# Patient Record
Sex: FEMALE | Race: BLACK OR AFRICAN AMERICAN | HISPANIC OR LATINO | Employment: FULL TIME | URBAN - METROPOLITAN AREA
[De-identification: names, ages, dates, MRNs, and addresses within clinical notes are randomized per-mention and may not be internally consistent; named-entity substitution may affect disease eponyms.]

---

## 2017-08-09 ENCOUNTER — OFFICE VISIT (OUTPATIENT)
Dept: OBSTETRICS AND GYNECOLOGY | Facility: CLINIC | Age: 32
End: 2017-08-09
Payer: COMMERCIAL

## 2017-08-09 VITALS
WEIGHT: 185.19 LBS | HEIGHT: 65 IN | SYSTOLIC BLOOD PRESSURE: 110 MMHG | BODY MASS INDEX: 30.85 KG/M2 | DIASTOLIC BLOOD PRESSURE: 60 MMHG

## 2017-08-09 DIAGNOSIS — Z30.9 ENCOUNTER FOR CONTRACEPTIVE MANAGEMENT, UNSPECIFIED TYPE: ICD-10-CM

## 2017-08-09 DIAGNOSIS — Z01.419 ENCOUNTER FOR GYNECOLOGICAL EXAMINATION WITHOUT ABNORMAL FINDING: Primary | ICD-10-CM

## 2017-08-09 PROCEDURE — 99999 PR PBB SHADOW E&M-EST. PATIENT-LVL III: CPT | Mod: PBBFAC,,, | Performed by: OBSTETRICS & GYNECOLOGY

## 2017-08-09 PROCEDURE — 99395 PREV VISIT EST AGE 18-39: CPT | Mod: S$GLB,,, | Performed by: OBSTETRICS & GYNECOLOGY

## 2017-08-09 RX ORDER — ETONOGESTREL AND ETHINYL ESTRADIOL VAGINAL RING .015; .12 MG/D; MG/D
RING VAGINAL
Qty: 1 EACH | Refills: 12 | Status: SHIPPED | OUTPATIENT
Start: 2017-08-09 | End: 2017-10-09 | Stop reason: SDUPTHER

## 2017-08-09 NOTE — PROGRESS NOTES
HISTORY OF PRESENT ILLNESS:    Angelita Figueroa is a 31 y.o. female, , No LMP recorded.,  presents for a routine exam and has no complaints.  Patient is on current contraception without difficulties. Normal monthly cycles, no break through bleeding or bothersome side effects. She desires to continue current contraception.    Past Medical History:   Diagnosis Date    Abnormal Pap smear     Anemia     mild       History reviewed. No pertinent surgical history.    MEDICATIONS AND ALLERGIES:      Current Outpatient Prescriptions:     NUVARING 0.12-0.015 mg/24 hr vaginal ring, Insert vaginally for 3  consecutive weeks, then  remove for 1 week., Disp: 1 each, Rfl: 12    No Known Allergies    Family History   Problem Relation Age of Onset    Diabetes Paternal Grandfather     Hypertension Paternal Grandfather     Diabetes Paternal Grandmother     Hypertension Paternal Grandmother     Diabetes Maternal Grandmother     Hypertension Maternal Grandmother     Diabetes Maternal Grandfather     Hypertension Maternal Grandfather     Breast cancer Neg Hx     Ovarian cancer Neg Hx        Social History     Social History    Marital status: Single     Spouse name: N/A    Number of children: N/A    Years of education: N/A     Occupational History    Not on file.     Social History Main Topics    Smoking status: Never Smoker    Smokeless tobacco: Never Used    Alcohol use 0.6 oz/week     1 Glasses of wine per week      Comment: social    Drug use: No    Sexual activity: Yes     Partners: Male     Birth control/ protection: None     Other Topics Concern    Not on file     Social History Narrative    No narrative on file       COMPREHENSIVE GYN HISTORY:  PAP History: Denies abnormal Paps.  Infection History: Denies STDs. Denies PID.  Benign History: Denies uterine fibroids. Denies ovarian cysts. Denies endometriosis. Denies other conditions.  Cancer History: Denies cervical cancer. Denies uterine cancer  "or hyperplasia. Denies ovarian cancer. Denies vulvar cancer or pre-cancer. Denies vaginal cancer or pre-cancer. Denies breast cancer. Denies colon cancer.  Sexual Activity History: Reports currently being sexually active  Menstrual History: Every   days, flows for    days. (Light, Mod, Heavy) flow.  Contraception:    ROS:  GENERAL: No weight changes. No swelling. No fatigue. No fever.  CARDIOVASCULAR: No chest pain. No shortness of breath. No leg cramps.   NEUROLOGICAL: No headaches. No vision changes.  BREASTS: No pain. No lumps. No discharge.  ABDOMEN: No pain. No nausea. No vomiting. No diarrhea. No constipation.  REPRODUCTIVE: No abnormal bleeding.   VULVA: No pain. No lesions. No itching.  VAGINA: No relaxation. No itching. No odor. No discharge. No lesions.  URINARY: No incontinence. No nocturia. No frequency. No dysuria.    Ht 5' 5" (1.651 m)   Wt 84 kg (185 lb 3 oz)   BMI 30.82 kg/m²     PE:  APPEARANCE: Well nourished, well developed, in no acute distress.  AFFECT: WNL, alert and oriented x 3.  SKIN: No acne or hirsutism.  NECK: Neck symmetric, without masses or thyromegaly.  NODES: No inguinal, cervical, axillary or femoral lymph node enlargement.  CHEST: Good respiratory effort.   ABDOMEN: Soft. No tenderness or masses. No hepatosplenomegaly. No hernias.  BREASTS: Symmetrical, no skin changes, visible lesions, palpable masses or nipple discharge bilaterally.  PELVIC: External female genitalia without lesions.  Female hair distribution. Adequate perineal body, Normal urethral meatus. Vagina moist and well rugated without lesions or discharge.  No significant cystocele or rectocele present. Cervix pink without lesions, discharge or tenderness. Uterus is 4-6 week size, regular, mobile and nontender. Adnexa without masses or tenderness.  EXTREMITIES: No edema    PROCEDURES:  Pap    DIAGNOSIS:  1. Gyn exam  2. Contraception    LABS AND TESTS ORDERED:    MEDICATIONS PRESCRIBED:    COUNSELING:  The patient was " counseled today on:  -A.C.S. Pap and pelvic exam guidelines, recomendations for yearly mammogram, monthly self breast exams and to follow up with her PCP for other health maintenance.    FOLLOW-UP with me annually.

## 2017-08-09 NOTE — PATIENT INSTRUCTIONS
[facility], operated by [facility]   Dr. Colten Benavides   [address] [address]   Phone: [phone]   Fax: [phone]

## 2017-09-07 ENCOUNTER — OFFICE VISIT (OUTPATIENT)
Dept: INTERNAL MEDICINE | Facility: CLINIC | Age: 32
End: 2017-09-07
Attending: FAMILY MEDICINE
Payer: COMMERCIAL

## 2017-09-07 ENCOUNTER — LAB VISIT (OUTPATIENT)
Dept: LAB | Facility: OTHER | Age: 32
End: 2017-09-07
Attending: FAMILY MEDICINE
Payer: COMMERCIAL

## 2017-09-07 VITALS
WEIGHT: 186.06 LBS | DIASTOLIC BLOOD PRESSURE: 60 MMHG | OXYGEN SATURATION: 98 % | SYSTOLIC BLOOD PRESSURE: 92 MMHG | BODY MASS INDEX: 31 KG/M2 | HEART RATE: 78 BPM | HEIGHT: 65 IN

## 2017-09-07 DIAGNOSIS — Z00.00 ANNUAL PHYSICAL EXAM: ICD-10-CM

## 2017-09-07 DIAGNOSIS — Z00.00 ANNUAL PHYSICAL EXAM: Primary | ICD-10-CM

## 2017-09-07 DIAGNOSIS — E66.9 OBESITY, UNSPECIFIED OBESITY SEVERITY, UNSPECIFIED OBESITY TYPE: ICD-10-CM

## 2017-09-07 DIAGNOSIS — Z11.3 SCREEN FOR STD (SEXUALLY TRANSMITTED DISEASE): ICD-10-CM

## 2017-09-07 LAB
25(OH)D3+25(OH)D2 SERPL-MCNC: 33 NG/ML
ABO + RH BLD: NORMAL
ALBUMIN SERPL BCP-MCNC: 3.8 G/DL
ALP SERPL-CCNC: 66 U/L
ALT SERPL W/O P-5'-P-CCNC: 13 U/L
ANION GAP SERPL CALC-SCNC: 10 MMOL/L
AST SERPL-CCNC: 15 U/L
BASOPHILS # BLD AUTO: 0.01 K/UL
BASOPHILS NFR BLD: 0.2 %
BILIRUB SERPL-MCNC: 0.4 MG/DL
BLD GP AB SCN CELLS X3 SERPL QL: NORMAL
BUN SERPL-MCNC: 15 MG/DL
CALCIUM SERPL-MCNC: 9.8 MG/DL
CHLORIDE SERPL-SCNC: 105 MMOL/L
CHOLEST SERPL-MCNC: 210 MG/DL
CHOLEST/HDLC SERPL: 3.2 {RATIO}
CO2 SERPL-SCNC: 25 MMOL/L
CREAT SERPL-MCNC: 0.8 MG/DL
DIFFERENTIAL METHOD: ABNORMAL
EOSINOPHIL # BLD AUTO: 0 K/UL
EOSINOPHIL NFR BLD: 0.7 %
ERYTHROCYTE [DISTWIDTH] IN BLOOD BY AUTOMATED COUNT: 14.9 %
EST. GFR  (AFRICAN AMERICAN): >60 ML/MIN/1.73 M^2
EST. GFR  (NON AFRICAN AMERICAN): >60 ML/MIN/1.73 M^2
ESTIMATED AVG GLUCOSE: 105 MG/DL
FERRITIN SERPL-MCNC: 30 NG/ML
FOLATE SERPL-MCNC: 13.3 NG/ML
GLUCOSE SERPL-MCNC: 82 MG/DL
HAV IGM SERPL QL IA: NEGATIVE
HBA1C MFR BLD HPLC: 5.3 %
HBV CORE IGM SERPL QL IA: NEGATIVE
HBV SURFACE AG SERPL QL IA: NEGATIVE
HCT VFR BLD AUTO: 39.1 %
HCV AB SERPL QL IA: NEGATIVE
HDLC SERPL-MCNC: 66 MG/DL
HDLC SERPL: 31.4 %
HGB BLD-MCNC: 12.3 G/DL
HIV 1+2 AB+HIV1 P24 AG SERPL QL IA: NEGATIVE
IRON SERPL-MCNC: 62 UG/DL
LDLC SERPL CALC-MCNC: 133.2 MG/DL
LYMPHOCYTES # BLD AUTO: 2.2 K/UL
LYMPHOCYTES NFR BLD: 37.7 %
MCH RBC QN AUTO: 26.9 PG
MCHC RBC AUTO-ENTMCNC: 31.5 G/DL
MCV RBC AUTO: 85 FL
MONOCYTES # BLD AUTO: 0.2 K/UL
MONOCYTES NFR BLD: 3.4 %
NEUTROPHILS # BLD AUTO: 3.4 K/UL
NEUTROPHILS NFR BLD: 57.8 %
NONHDLC SERPL-MCNC: 144 MG/DL
PLATELET # BLD AUTO: 272 K/UL
PMV BLD AUTO: 10.5 FL
POTASSIUM SERPL-SCNC: 3.9 MMOL/L
PROT SERPL-MCNC: 7.7 G/DL
RBC # BLD AUTO: 4.58 M/UL
SATURATED IRON: 13 %
SODIUM SERPL-SCNC: 140 MMOL/L
TOTAL IRON BINDING CAPACITY: 462 UG/DL
TRANSFERRIN SERPL-MCNC: 312 MG/DL
TRIGL SERPL-MCNC: 54 MG/DL
VIT B12 SERPL-MCNC: 413 PG/ML
WBC # BLD AUTO: 5.81 K/UL

## 2017-09-07 PROCEDURE — 83036 HEMOGLOBIN GLYCOSYLATED A1C: CPT

## 2017-09-07 PROCEDURE — 82306 VITAMIN D 25 HYDROXY: CPT

## 2017-09-07 PROCEDURE — 82746 ASSAY OF FOLIC ACID SERUM: CPT

## 2017-09-07 PROCEDURE — 36415 COLL VENOUS BLD VENIPUNCTURE: CPT

## 2017-09-07 PROCEDURE — 86592 SYPHILIS TEST NON-TREP QUAL: CPT

## 2017-09-07 PROCEDURE — 82607 VITAMIN B-12: CPT

## 2017-09-07 PROCEDURE — 99999 PR PBB SHADOW E&M-EST. PATIENT-LVL III: CPT | Mod: PBBFAC,,, | Performed by: FAMILY MEDICINE

## 2017-09-07 PROCEDURE — 83540 ASSAY OF IRON: CPT

## 2017-09-07 PROCEDURE — 86703 HIV-1/HIV-2 1 RESULT ANTBDY: CPT

## 2017-09-07 PROCEDURE — 80074 ACUTE HEPATITIS PANEL: CPT

## 2017-09-07 PROCEDURE — 80061 LIPID PANEL: CPT

## 2017-09-07 PROCEDURE — 86850 RBC ANTIBODY SCREEN: CPT

## 2017-09-07 PROCEDURE — 82728 ASSAY OF FERRITIN: CPT

## 2017-09-07 PROCEDURE — 80053 COMPREHEN METABOLIC PANEL: CPT

## 2017-09-07 PROCEDURE — 86900 BLOOD TYPING SEROLOGIC ABO: CPT

## 2017-09-07 PROCEDURE — 99385 PREV VISIT NEW AGE 18-39: CPT | Mod: S$GLB,,, | Performed by: FAMILY MEDICINE

## 2017-09-07 PROCEDURE — 85025 COMPLETE CBC W/AUTO DIFF WBC: CPT

## 2017-09-07 PROCEDURE — 87591 N.GONORRHOEAE DNA AMP PROB: CPT

## 2017-09-07 NOTE — PROGRESS NOTES
"Subjective:      Patient ID: Angelita Figueroa is a 31 y.o. female.    Chief Complaint: Establish Care    She is here for annual exam. She has a history of anemia. She drinks 8 fl oz water daily. She does dance once a week and walks 1 hour once a week.     Breakfast  Smoothie-strawberry, juices, protein   Cream of wheat  Honey bunches of oats    Snacks  Candy   plantain chips    Lunch   Salads-chicken masha  Pizza    Dinner  BBQ and mac cheese       Review of Systems   Constitutional: Negative for activity change and unexpected weight change.   HENT: Negative for hearing loss, rhinorrhea and trouble swallowing.    Eyes: Negative for discharge and visual disturbance.   Respiratory: Negative for chest tightness and wheezing.    Cardiovascular: Negative for chest pain and palpitations.   Gastrointestinal: Negative for blood in stool, constipation, diarrhea and vomiting.   Endocrine: Negative for polydipsia and polyuria.   Genitourinary: Negative for difficulty urinating, dysuria, hematuria and menstrual problem.   Musculoskeletal: Negative for arthralgias, joint swelling and neck pain.   Neurological: Negative for weakness and headaches.   Psychiatric/Behavioral: Negative for confusion and dysphoric mood.     I personally reviewed Past Medical History, Past Surgical history,  Past Social History and Family History    Objective:   BP 92/60   Pulse 78   Ht 5' 5" (1.651 m)   Wt 84.4 kg (186 lb 1.1 oz)   LMP 07/13/2017   SpO2 98%   BMI 30.96 kg/m²     Physical Exam   Constitutional: She is oriented to person, place, and time. She appears well-developed and well-nourished. No distress.   HENT:   Head: Normocephalic.   Right Ear: External ear normal.   Left Ear: External ear normal.   Mouth/Throat: Oropharynx is clear and moist.   Eyes: Conjunctivae and EOM are normal. Pupils are equal, round, and reactive to light. Right eye exhibits no discharge. Left eye exhibits no discharge. No scleral icterus.   Neck: Normal range " of motion. No tracheal deviation present. No thyromegaly present.   Cardiovascular: Normal rate, regular rhythm, normal heart sounds and intact distal pulses.  Exam reveals no gallop.    No murmur heard.  Pulmonary/Chest: Effort normal and breath sounds normal. No respiratory distress. She has no wheezes. She has no rales. She exhibits no tenderness.   Abdominal: Soft. Bowel sounds are normal. She exhibits no distension and no mass. There is no tenderness. There is no rebound and no guarding.   Musculoskeletal: Normal range of motion.   Neurological: She is alert and oriented to person, place, and time.   Skin: Skin is warm and dry.   Psychiatric: She has a normal mood and affect. Her behavior is normal. Judgment and thought content normal.   Vitals reviewed.      Angelita was seen today for establish care.    Diagnoses and all orders for this visit:    Annual physical exam  -     CBC auto differential; Future  -     Comprehensive metabolic panel; Future  -     Lipid panel; Future  -     Hemoglobin A1c; Future  -     Vitamin D; Future  -     TYPE & SCREEN; Future  -     Vitamin B12; Future  -     Folate; Future  -     Ferritin; Future  -     Iron and TIBC; Future  -     HIV-1 and HIV-2 antibodies; Future  -     RPR; Future  -     Hepatitis panel, acute; Future  -     C. trachomatis/N. gonorrhoeae by AMP DNA Urine    Obesity, unspecified obesity severity, unspecified obesity type  -discussed dietary modifications, return in 4 weeks    Screen for STD (sexually transmitted disease)  -     HIV-1 and HIV-2 antibodies; Future  -     RPR; Future  -     Hepatitis panel, acute; Future  -     C. trachomatis/N. gonorrhoeae by AMP DNA Urine

## 2017-09-07 NOTE — PATIENT INSTRUCTIONS
100 fl oz water daily     Take fiber supplements such as Metamucil, Citrucel, Konsyl, etc. (Benefiber is less effective so avoid)  The goal is 1-2 nonstraining nonloose bowel movements per day.  In general we recommend about 25-30 grams of fiber daily or reaching the above bowel movement goal.    [email or web address]

## 2017-09-08 LAB
C TRACH DNA SPEC QL NAA+PROBE: NOT DETECTED
N GONORRHOEA DNA SPEC QL NAA+PROBE: NOT DETECTED
RPR SER QL: NORMAL

## 2017-10-09 DIAGNOSIS — Z30.9 ENCOUNTER FOR CONTRACEPTIVE MANAGEMENT, UNSPECIFIED TYPE: ICD-10-CM

## 2017-10-12 RX ORDER — ETONOGESTREL AND ETHINYL ESTRADIOL .12; .015 MG/D; MG/D
INSERT, EXTENDED RELEASE VAGINAL
Qty: 1 EACH | Refills: 12 | Status: SHIPPED | OUTPATIENT
Start: 2017-10-12 | End: 2018-08-08 | Stop reason: SDUPTHER

## 2017-10-19 ENCOUNTER — CLINICAL SUPPORT (OUTPATIENT)
Dept: INTERNAL MEDICINE | Facility: CLINIC | Age: 32
End: 2017-10-19
Payer: COMMERCIAL

## 2017-10-19 VITALS — BODY MASS INDEX: 30.82 KG/M2 | WEIGHT: 185.19 LBS

## 2018-03-22 ENCOUNTER — PATIENT MESSAGE (OUTPATIENT)
Dept: INTERNAL MEDICINE | Facility: CLINIC | Age: 33
End: 2018-03-22

## 2018-08-08 ENCOUNTER — OFFICE VISIT (OUTPATIENT)
Dept: OBSTETRICS AND GYNECOLOGY | Facility: CLINIC | Age: 33
End: 2018-08-08
Payer: COMMERCIAL

## 2018-08-08 VITALS
WEIGHT: 186.06 LBS | HEIGHT: 65 IN | BODY MASS INDEX: 31 KG/M2 | DIASTOLIC BLOOD PRESSURE: 66 MMHG | SYSTOLIC BLOOD PRESSURE: 104 MMHG

## 2018-08-08 DIAGNOSIS — Z30.9 ENCOUNTER FOR CONTRACEPTIVE MANAGEMENT, UNSPECIFIED TYPE: ICD-10-CM

## 2018-08-08 DIAGNOSIS — Z01.419 ENCOUNTER FOR GYNECOLOGICAL EXAMINATION WITHOUT ABNORMAL FINDING: Primary | ICD-10-CM

## 2018-08-08 PROCEDURE — 99999 PR PBB SHADOW E&M-EST. PATIENT-LVL II: CPT | Mod: PBBFAC,,, | Performed by: OBSTETRICS & GYNECOLOGY

## 2018-08-08 PROCEDURE — 99395 PREV VISIT EST AGE 18-39: CPT | Mod: S$GLB,,, | Performed by: OBSTETRICS & GYNECOLOGY

## 2018-08-08 PROCEDURE — 88175 CYTOPATH C/V AUTO FLUID REDO: CPT

## 2018-08-08 RX ORDER — ETONOGESTREL AND ETHINYL ESTRADIOL VAGINAL RING .015; .12 MG/D; MG/D
RING VAGINAL
Qty: 1 EACH | Refills: 12 | Status: SHIPPED | OUTPATIENT
Start: 2018-08-08 | End: 2023-03-01

## 2018-08-08 NOTE — PROGRESS NOTES
HISTORY OF PRESENT ILLNESS:    Angelita Figueroa is a 32 y.o. female, , Patient's last menstrual period was 07/15/2018.,  presents for a routine exam and has no complaints.  Patient is on current contraception without difficulties. Normal monthly cycles, no break through bleeding or bothersome side effects. She desires to continue current contraception.      Past Medical History:   Diagnosis Date    Abnormal Pap smear     Anemia     mild    Obese        Past Surgical History:   Procedure Laterality Date    hammertoe surgery bilateral         MEDICATIONS AND ALLERGIES:      Current Outpatient Prescriptions:     etonogestrel-ethinyl estradiol (NUVARING) 0.12-0.015 mg/24 hr vaginal ring, INSERT 1 RING VAGINALLY FOR 3 CONSECUTIVE WEEKS, THEN  REMOVE FOR 1 WEEK., Disp: 1 each, Rfl: 12    Review of patient's allergies indicates:  No Known Allergies    Family History   Problem Relation Age of Onset    Diabetes Paternal Grandfather     Hypertension Paternal Grandfather     Diabetes Paternal Grandmother     Hypertension Paternal Grandmother     Diabetes Maternal Grandmother     Hypertension Maternal Grandmother     Diabetes Maternal Grandfather     Hypertension Maternal Grandfather     Diabetes Father     Aneurysm Maternal Uncle     Anuerysm Maternal Uncle     Breast cancer Neg Hx     Ovarian cancer Neg Hx        Social History     Social History    Marital status: Single     Spouse name: N/A    Number of children: N/A    Years of education: N/A     Occupational History           Social History Main Topics    Smoking status: Never Smoker    Smokeless tobacco: Never Used    Alcohol use Yes      Comment: social    Drug use: No    Sexual activity: Not Currently     Partners: Male     Birth control/ protection: Inserts     Other Topics Concern    Not on file     Social History Narrative    No narrative on file       COMPREHENSIVE GYN HISTORY:  PAP History: Denies  "abnormal Paps.  Infection History: Denies STDs. Denies PID.  Benign History: Denies uterine fibroids. Denies ovarian cysts. Denies endometriosis. Denies other conditions.  Cancer History: Denies cervical cancer. Denies uterine cancer or hyperplasia. Denies ovarian cancer. Denies vulvar cancer or pre-cancer. Denies vaginal cancer or pre-cancer. Denies breast cancer. Denies colon cancer.  Sexual Activity History: Reports currently being sexually active  Menstrual History: Monthly. Mod then light flow.   Dysmenorrhea History: Reports mild dysmenorrhea.       ROS:  GENERAL: No weight changes. No swelling. No fatigue. No fever.  CARDIOVASCULAR: No chest pain. No shortness of breath. No leg cramps.   NEUROLOGICAL: No headaches. No vision changes.  BREASTS: No pain. No lumps. No discharge.  ABDOMEN: No pain. No nausea. No vomiting. No diarrhea. No constipation.  REPRODUCTIVE: No abnormal bleeding.   VULVA: No pain. No lesions. No itching.  VAGINA: No relaxation. No itching. No odor. No discharge. No lesions.  URINARY: No incontinence. No nocturia. No frequency. No dysuria.    /66   Ht 5' 5" (1.651 m)   Wt 84.4 kg (186 lb 1.1 oz)   LMP 07/15/2018   BMI 30.96 kg/m²     PE:  APPEARANCE: Well nourished, well developed, in no acute distress.  AFFECT: WNL, alert and oriented x 3.  SKIN: No acne or hirsutism.  NECK: Neck symmetric, without masses or thyromegaly.  NODES: No inguinal, cervical, axillary or femoral lymph node enlargement.  CHEST: Good respiratory effort.   ABDOMEN: Soft. No tenderness or masses. No hepatosplenomegaly. No hernias.  BREASTS: Symmetrical, no skin changes, visible lesions, palpable masses or nipple discharge bilaterally.  PELVIC: External female genitalia without lesions.  Female hair distribution. Adequate perineal body, Normal urethral meatus. Vagina moist and well rugated without lesions or discharge.  No significant cystocele or rectocele present. Cervix pink without lesions, discharge or " tenderness. Uterus is 4-6 week size, regular, mobile and nontender. Adnexa without masses or tenderness.  EXTREMITIES: No edema    DIAGNOSIS:  1. Encounter for gynecological examination without abnormal finding    2. Encounter for contraceptive management, unspecified type        PLAN:    Orders Placed This Encounter    Liquid-based pap smear, screening    etonogestrel-ethinyl estradiol (NUVARING) 0.12-0.015 mg/24 hr vaginal ring       COUNSELING:  The patient was counseled today on:  -A.C.S. Pap and pelvic exam guidelines (pap every 3 years), recomendations for yearly mammogram;  -to follow up with her PCP for other health maintenance.    FOLLOW-UP with me annually.

## 2023-03-01 ENCOUNTER — OFFICE VISIT (OUTPATIENT)
Dept: OBSTETRICS AND GYNECOLOGY | Facility: CLINIC | Age: 38
End: 2023-03-01
Payer: COMMERCIAL

## 2023-03-01 ENCOUNTER — LAB VISIT (OUTPATIENT)
Dept: LAB | Facility: OTHER | Age: 38
End: 2023-03-01
Attending: STUDENT IN AN ORGANIZED HEALTH CARE EDUCATION/TRAINING PROGRAM
Payer: COMMERCIAL

## 2023-03-01 VITALS
HEIGHT: 65 IN | SYSTOLIC BLOOD PRESSURE: 108 MMHG | BODY MASS INDEX: 32.73 KG/M2 | DIASTOLIC BLOOD PRESSURE: 74 MMHG | WEIGHT: 196.44 LBS

## 2023-03-01 DIAGNOSIS — N91.2 AMENORRHEA: ICD-10-CM

## 2023-03-01 DIAGNOSIS — Z01.419 ENCOUNTER FOR WELL WOMAN EXAM: Primary | ICD-10-CM

## 2023-03-01 DIAGNOSIS — Z01.419 ENCOUNTER FOR WELL WOMAN EXAM: ICD-10-CM

## 2023-03-01 LAB
HCG INTACT+B SERPL-ACNC: <1.2 MIU/ML
RPR SER QL: NORMAL
TSH SERPL DL<=0.005 MIU/L-ACNC: 0.7 UIU/ML (ref 0.4–4)

## 2023-03-01 PROCEDURE — 87624 HPV HI-RISK TYP POOLED RSLT: CPT | Performed by: STUDENT IN AN ORGANIZED HEALTH CARE EDUCATION/TRAINING PROGRAM

## 2023-03-01 PROCEDURE — 81514 NFCT DS BV&VAGINITIS DNA ALG: CPT | Performed by: STUDENT IN AN ORGANIZED HEALTH CARE EDUCATION/TRAINING PROGRAM

## 2023-03-01 PROCEDURE — 84146 ASSAY OF PROLACTIN: CPT | Performed by: STUDENT IN AN ORGANIZED HEALTH CARE EDUCATION/TRAINING PROGRAM

## 2023-03-01 PROCEDURE — 3074F PR MOST RECENT SYSTOLIC BLOOD PRESSURE < 130 MM HG: ICD-10-PCS | Mod: CPTII,S$GLB,, | Performed by: STUDENT IN AN ORGANIZED HEALTH CARE EDUCATION/TRAINING PROGRAM

## 2023-03-01 PROCEDURE — 3074F SYST BP LT 130 MM HG: CPT | Mod: CPTII,S$GLB,, | Performed by: STUDENT IN AN ORGANIZED HEALTH CARE EDUCATION/TRAINING PROGRAM

## 2023-03-01 PROCEDURE — 3078F DIAST BP <80 MM HG: CPT | Mod: CPTII,S$GLB,, | Performed by: STUDENT IN AN ORGANIZED HEALTH CARE EDUCATION/TRAINING PROGRAM

## 2023-03-01 PROCEDURE — 3078F PR MOST RECENT DIASTOLIC BLOOD PRESSURE < 80 MM HG: ICD-10-PCS | Mod: CPTII,S$GLB,, | Performed by: STUDENT IN AN ORGANIZED HEALTH CARE EDUCATION/TRAINING PROGRAM

## 2023-03-01 PROCEDURE — 88175 CYTOPATH C/V AUTO FLUID REDO: CPT | Performed by: STUDENT IN AN ORGANIZED HEALTH CARE EDUCATION/TRAINING PROGRAM

## 2023-03-01 PROCEDURE — 84443 ASSAY THYROID STIM HORMONE: CPT | Performed by: STUDENT IN AN ORGANIZED HEALTH CARE EDUCATION/TRAINING PROGRAM

## 2023-03-01 PROCEDURE — 99999 PR PBB SHADOW E&M-NEW PATIENT-LVL III: ICD-10-PCS | Mod: PBBFAC,,, | Performed by: STUDENT IN AN ORGANIZED HEALTH CARE EDUCATION/TRAINING PROGRAM

## 2023-03-01 PROCEDURE — 3008F BODY MASS INDEX DOCD: CPT | Mod: CPTII,S$GLB,, | Performed by: STUDENT IN AN ORGANIZED HEALTH CARE EDUCATION/TRAINING PROGRAM

## 2023-03-01 PROCEDURE — 87591 N.GONORRHOEAE DNA AMP PROB: CPT | Performed by: STUDENT IN AN ORGANIZED HEALTH CARE EDUCATION/TRAINING PROGRAM

## 2023-03-01 PROCEDURE — 1160F RVW MEDS BY RX/DR IN RCRD: CPT | Mod: CPTII,S$GLB,, | Performed by: STUDENT IN AN ORGANIZED HEALTH CARE EDUCATION/TRAINING PROGRAM

## 2023-03-01 PROCEDURE — 99999 PR PBB SHADOW E&M-NEW PATIENT-LVL III: CPT | Mod: PBBFAC,,, | Performed by: STUDENT IN AN ORGANIZED HEALTH CARE EDUCATION/TRAINING PROGRAM

## 2023-03-01 PROCEDURE — 86592 SYPHILIS TEST NON-TREP QUAL: CPT | Performed by: STUDENT IN AN ORGANIZED HEALTH CARE EDUCATION/TRAINING PROGRAM

## 2023-03-01 PROCEDURE — 87491 CHLMYD TRACH DNA AMP PROBE: CPT | Performed by: STUDENT IN AN ORGANIZED HEALTH CARE EDUCATION/TRAINING PROGRAM

## 2023-03-01 PROCEDURE — 80074 ACUTE HEPATITIS PANEL: CPT | Performed by: STUDENT IN AN ORGANIZED HEALTH CARE EDUCATION/TRAINING PROGRAM

## 2023-03-01 PROCEDURE — 87389 HIV-1 AG W/HIV-1&-2 AB AG IA: CPT | Performed by: STUDENT IN AN ORGANIZED HEALTH CARE EDUCATION/TRAINING PROGRAM

## 2023-03-01 PROCEDURE — 1159F PR MEDICATION LIST DOCUMENTED IN MEDICAL RECORD: ICD-10-PCS | Mod: CPTII,S$GLB,, | Performed by: STUDENT IN AN ORGANIZED HEALTH CARE EDUCATION/TRAINING PROGRAM

## 2023-03-01 PROCEDURE — 84702 CHORIONIC GONADOTROPIN TEST: CPT | Performed by: STUDENT IN AN ORGANIZED HEALTH CARE EDUCATION/TRAINING PROGRAM

## 2023-03-01 PROCEDURE — 99385 PR PREVENTIVE VISIT,NEW,18-39: ICD-10-PCS | Mod: S$GLB,,, | Performed by: STUDENT IN AN ORGANIZED HEALTH CARE EDUCATION/TRAINING PROGRAM

## 2023-03-01 PROCEDURE — 1159F MED LIST DOCD IN RCRD: CPT | Mod: CPTII,S$GLB,, | Performed by: STUDENT IN AN ORGANIZED HEALTH CARE EDUCATION/TRAINING PROGRAM

## 2023-03-01 PROCEDURE — 99385 PREV VISIT NEW AGE 18-39: CPT | Mod: S$GLB,,, | Performed by: STUDENT IN AN ORGANIZED HEALTH CARE EDUCATION/TRAINING PROGRAM

## 2023-03-01 PROCEDURE — 1160F PR REVIEW ALL MEDS BY PRESCRIBER/CLIN PHARMACIST DOCUMENTED: ICD-10-PCS | Mod: CPTII,S$GLB,, | Performed by: STUDENT IN AN ORGANIZED HEALTH CARE EDUCATION/TRAINING PROGRAM

## 2023-03-01 PROCEDURE — 36415 COLL VENOUS BLD VENIPUNCTURE: CPT | Performed by: STUDENT IN AN ORGANIZED HEALTH CARE EDUCATION/TRAINING PROGRAM

## 2023-03-01 PROCEDURE — 3008F PR BODY MASS INDEX (BMI) DOCUMENTED: ICD-10-PCS | Mod: CPTII,S$GLB,, | Performed by: STUDENT IN AN ORGANIZED HEALTH CARE EDUCATION/TRAINING PROGRAM

## 2023-03-01 NOTE — PROGRESS NOTES
History & Physical  Gynecology      SUBJECTIVE:     Chief Complaint: Well Woman       History of Present Illness:  Angelita Figueroa is a 37 y.o.  who presents for annual physical exam.   She reports LMP was 4 months ago. Not currently sexually active, has not been since prior to pandemic. No other symptoms.    She reports her periods are usually regular interval, coming every 28-35 days with 5-7 days of flow.   She is not sexually active and uses nothing for contraception. Previously used nuvaring but discontinued it in 2017. Declines contraception at this time. Would like STI screening today.  No vaginal discharge, odor, or itching.     She has a distant history of abnormal pap smear.   Last pap was 2018 and was NILM.     She has no family history of breast, colon, or ovarian cancer.     She does not and has never used tobacco products.  She drinks alcohol socially.   She does not use recreational or other drugs.   She lives with her parents and reports she feels safe in her home.         Review of patient's allergies indicates:  No Known Allergies    Past Medical History:   Diagnosis Date    Abnormal Pap smear     Obese      Past Surgical History:   Procedure Laterality Date    hammertoe surgery bilateral       OB History          0    Para   0    Term   0       0    AB   0    Living   0         SAB   0    IAB   0    Ectopic   0    Multiple   0    Live Births                   Family History   Problem Relation Age of Onset    Diabetes Paternal Grandfather     Hypertension Paternal Grandfather     Diabetes Paternal Grandmother     Hypertension Paternal Grandmother     Diabetes Maternal Grandmother     Hypertension Maternal Grandmother     Diabetes Maternal Grandfather     Hypertension Maternal Grandfather     Diabetes Father     Aneurysm Maternal Uncle     Anuerysm Maternal Uncle     Breast cancer Neg Hx     Ovarian cancer Neg Hx     Colon cancer Neg Hx      Social History     Tobacco Use     Smoking status: Never    Smokeless tobacco: Never   Substance Use Topics    Alcohol use: Yes     Comment: social    Drug use: No       No current outpatient medications on file.     No current facility-administered medications for this visit.         Review of Systems:  Review of Systems   Constitutional:  Negative for chills and fever.   Respiratory:  Negative for shortness of breath.    Cardiovascular:  Negative for chest pain.   Gastrointestinal:  Negative for abdominal pain, constipation, diarrhea, nausea and vomiting.   Genitourinary:  Positive for menstrual problem. Negative for pelvic pain, vaginal bleeding, vaginal discharge and vaginal odor.   Integumentary:  Negative for breast mass, nipple discharge and breast skin changes.   Neurological:  Negative for headaches.   Breast: Negative for mass, nipple discharge and skin changes     OBJECTIVE:     Physical Exam:  Physical Exam  Vitals reviewed. Exam conducted with a chaperone present.   Constitutional:       General: She is not in acute distress.     Appearance: She is well-developed. She is not ill-appearing, toxic-appearing or diaphoretic.   HENT:      Head: Normocephalic and atraumatic.   Eyes:      Conjunctiva/sclera: Conjunctivae normal.   Cardiovascular:      Rate and Rhythm: Normal rate.   Pulmonary:      Effort: Pulmonary effort is normal. No respiratory distress.   Chest:   Breasts:     Right: Normal. No swelling, bleeding, inverted nipple, mass, nipple discharge, skin change or tenderness.      Left: Normal. No swelling, bleeding, inverted nipple, mass, nipple discharge, skin change or tenderness.   Abdominal:      Palpations: Abdomen is soft. There is no mass.      Tenderness: There is no abdominal tenderness. There is no guarding or rebound.   Genitourinary:     General: Normal vulva.      Vagina: Normal. No vaginal discharge or bleeding.      Cervix: No cervical motion tenderness.      Uterus: Not enlarged and not tender.       Adnexa:          Right: No mass, tenderness or fullness.          Left: No mass, tenderness or fullness.        Comments: External genitalia: Normal  Urethral: Nontender, no masses  Urethral meatus: Normal  Bladder: Non-tender  Musculoskeletal:         General: Normal range of motion.      Cervical back: Normal range of motion.   Skin:     General: Skin is warm and dry.   Neurological:      Mental Status: She is alert.   Psychiatric:         Mood and Affect: Mood normal.         Behavior: Behavior normal.         ASSESSMENT:       ICD-10-CM ICD-9-CM    1. Encounter for well woman exam  Z01.419 V72.31 Liquid-Based Pap Smear, Screening      HPV High Risk Genotypes, PCR      C. trachomatis/N. gonorrhoeae by AMP DNA      VAGINOSIS SCREEN BY DNA PROBE      HIV 1/2 Ag/Ab (4th Gen)      RPR      HEPATITIS PANEL, ACUTE      CANCELED: TSH      CANCELED: PROLACTIN      2. Amenorrhea  N91.2 626.0 HCG, QUANTITATIVE, PREGNANCY      TSH      PROLACTIN             Plan:      WWE  -- Pap and HPV today.  -- Contraception declined  -- Full STI panel      Amenorrhea  -- Hcg added on to labs  -- TSH and prolactin ordered  -- Discussed that, if normal, would recommend provera challenge as next step in workup      Amber Keith MD

## 2023-03-02 LAB
HIV 1+2 AB+HIV1 P24 AG SERPL QL IA: NORMAL
PROLACTIN SERPL IA-MCNC: 32.4 NG/ML (ref 5.2–26.5)

## 2023-03-03 DIAGNOSIS — N91.2 AMENORRHEA: Primary | ICD-10-CM

## 2023-03-03 LAB
C TRACH DNA SPEC QL NAA+PROBE: NOT DETECTED
HAV IGM SERPL QL IA: NORMAL
HBV CORE IGM SERPL QL IA: NORMAL
HBV SURFACE AG SERPL QL IA: NORMAL
HCV AB SERPL QL IA: NORMAL
N GONORRHOEA DNA SPEC QL NAA+PROBE: NOT DETECTED

## 2023-03-06 ENCOUNTER — LAB VISIT (OUTPATIENT)
Dept: LAB | Facility: HOSPITAL | Age: 38
End: 2023-03-06
Attending: NURSE PRACTITIONER
Payer: COMMERCIAL

## 2023-03-06 DIAGNOSIS — N91.2 AMENORRHEA: ICD-10-CM

## 2023-03-06 LAB
BACTERIAL VAGINOSIS DNA: NEGATIVE
CANDIDA GLABRATA DNA: NEGATIVE
CANDIDA KRUSEI DNA: NEGATIVE
CANDIDA RRNA VAG QL PROBE: NEGATIVE
ESTRADIOL SERPL-MCNC: 179 PG/ML
FSH SERPL-ACNC: 21.21 MIU/ML
PROLACTIN SERPL IA-MCNC: 22.5 NG/ML (ref 5.2–26.5)
T VAGINALIS RRNA GENITAL QL PROBE: NEGATIVE

## 2023-03-06 PROCEDURE — 36415 COLL VENOUS BLD VENIPUNCTURE: CPT | Mod: PO | Performed by: STUDENT IN AN ORGANIZED HEALTH CARE EDUCATION/TRAINING PROGRAM

## 2023-03-06 PROCEDURE — 84146 ASSAY OF PROLACTIN: CPT | Performed by: STUDENT IN AN ORGANIZED HEALTH CARE EDUCATION/TRAINING PROGRAM

## 2023-03-06 PROCEDURE — 82670 ASSAY OF TOTAL ESTRADIOL: CPT | Performed by: STUDENT IN AN ORGANIZED HEALTH CARE EDUCATION/TRAINING PROGRAM

## 2023-03-06 PROCEDURE — 83001 ASSAY OF GONADOTROPIN (FSH): CPT | Performed by: STUDENT IN AN ORGANIZED HEALTH CARE EDUCATION/TRAINING PROGRAM

## 2023-03-07 ENCOUNTER — TELEPHONE (OUTPATIENT)
Dept: OBSTETRICS AND GYNECOLOGY | Facility: CLINIC | Age: 38
End: 2023-03-07
Payer: COMMERCIAL

## 2023-03-07 RX ORDER — MEDROXYPROGESTERONE ACETATE 10 MG/1
10 TABLET ORAL DAILY
Qty: 10 TABLET | Refills: 0 | Status: SHIPPED | OUTPATIENT
Start: 2023-03-07 | End: 2023-03-17

## 2023-03-07 NOTE — TELEPHONE ENCOUNTER
Called to discuss labs. TSH, PRL, FSH all WNL. Recommend 10 day course of provera to induce withdrawal bleeding. Rx sent. She will reach out after completing the medication to let us know if she gets her cycle. All questions answered.      Amber Keith MD

## 2023-03-08 LAB
FINAL PATHOLOGIC DIAGNOSIS: NORMAL
HPV HR 12 DNA SPEC QL NAA+PROBE: NEGATIVE
HPV16 AG SPEC QL: NEGATIVE
HPV18 DNA SPEC QL NAA+PROBE: NEGATIVE
Lab: NORMAL

## 2024-07-01 ENCOUNTER — OFFICE VISIT (OUTPATIENT)
Dept: OBSTETRICS AND GYNECOLOGY | Facility: CLINIC | Age: 39
End: 2024-07-01
Payer: COMMERCIAL

## 2024-07-01 VITALS — HEIGHT: 65 IN | BODY MASS INDEX: 34.85 KG/M2 | WEIGHT: 209.19 LBS

## 2024-07-01 DIAGNOSIS — Z01.419 WELL WOMAN EXAM: Primary | ICD-10-CM

## 2024-07-01 PROCEDURE — 99999 PR PBB SHADOW E&M-EST. PATIENT-LVL II: CPT | Mod: PBBFAC,,, | Performed by: STUDENT IN AN ORGANIZED HEALTH CARE EDUCATION/TRAINING PROGRAM

## 2024-07-01 PROCEDURE — 1159F MED LIST DOCD IN RCRD: CPT | Mod: CPTII,S$GLB,, | Performed by: STUDENT IN AN ORGANIZED HEALTH CARE EDUCATION/TRAINING PROGRAM

## 2024-07-01 PROCEDURE — 99395 PREV VISIT EST AGE 18-39: CPT | Mod: S$GLB,,, | Performed by: STUDENT IN AN ORGANIZED HEALTH CARE EDUCATION/TRAINING PROGRAM

## 2024-07-01 PROCEDURE — 3008F BODY MASS INDEX DOCD: CPT | Mod: CPTII,S$GLB,, | Performed by: STUDENT IN AN ORGANIZED HEALTH CARE EDUCATION/TRAINING PROGRAM

## 2024-07-01 NOTE — PROGRESS NOTES
History & Physical  Gynecology      SUBJECTIVE:     Chief Complaint: Well Woman       History of Present Illness:   Angelita Figueroa is a 38 y.o.  who presents for annual physical exam.   She has no complaints.    She reports her periods are regular interval with normal flow. Had one other prolonged cycle last year after visit last year, but not as long as the 4 month gap.  She is not on any contraception, does not want to be on contraception. Declines STI screen.  No vaginal discharge, odor, or itching.     She has no history of abnormal pap smear.   Last pap was 3/2023 and was NILM, HPV neg.       Review of patient's allergies indicates:  No Known Allergies     Past Medical History:   Diagnosis Date    Abnormal Pap smear     Obese       Past Surgical History:   Procedure Laterality Date    hammertoe surgery bilateral          OB History          0    Para   0    Term   0       0    AB   0    Living   0         SAB   0    IAB   0    Ectopic   0    Multiple   0    Live Births                      Family History   Problem Relation Name Age of Onset    Diabetes Paternal Grandfather      Hypertension Paternal Grandfather      Diabetes Paternal Grandmother      Hypertension Paternal Grandmother      Diabetes Maternal Grandmother      Hypertension Maternal Grandmother      Diabetes Maternal Grandfather      Hypertension Maternal Grandfather      Diabetes Father      Aneurysm Maternal Uncle      Anuerysm Maternal Uncle      Breast cancer Neg Hx      Ovarian cancer Neg Hx      Colon cancer Neg Hx          Social History     Tobacco Use    Smoking status: Never    Smokeless tobacco: Never   Substance Use Topics    Alcohol use: Yes     Comment: social    Drug use: No          No current outpatient medications on file.     No current facility-administered medications for this visit.            Review of Systems:  Review of Systems   Constitutional:  Negative for chills and fever.   Respiratory:   Negative for shortness of breath.    Cardiovascular:  Negative for chest pain.   Gastrointestinal:  Negative for abdominal pain, constipation, diarrhea, nausea and vomiting.   Genitourinary:  Negative for dysuria, menstrual problem, pelvic pain, vaginal bleeding, vaginal discharge and vaginal odor.   Integumentary:  Negative for breast mass, nipple discharge and breast skin changes.   Neurological:  Negative for headaches.   Breast: Negative for mass, nipple discharge and skin changes           OBJECTIVE:     Physical Exam:   Physical Exam  Vitals reviewed. Exam conducted with a chaperone present.   Constitutional:       General: She is not in acute distress.     Appearance: Normal appearance. She is well-developed. She is not ill-appearing or toxic-appearing.   HENT:      Head: Normocephalic and atraumatic.      Nose: Nose normal.   Eyes:      Extraocular Movements: Extraocular movements intact.      Conjunctiva/sclera: Conjunctivae normal.   Cardiovascular:      Rate and Rhythm: Normal rate.   Pulmonary:      Effort: Pulmonary effort is normal. No respiratory distress.   Chest:   Breasts:     Right: Normal. No swelling, bleeding, inverted nipple, mass, nipple discharge, skin change or tenderness.      Left: Normal. No swelling, bleeding, inverted nipple, mass, nipple discharge, skin change or tenderness.   Abdominal:      Palpations: Abdomen is soft. There is no mass.      Tenderness: There is no abdominal tenderness. There is no guarding or rebound.   Genitourinary:     Vagina: Normal. No vaginal discharge or bleeding.      Cervix: No cervical motion tenderness.      Uterus: Not enlarged and not tender.       Adnexa:         Right: No mass, tenderness or fullness.          Left: No mass, tenderness or fullness.        Comments: External genitalia: Normal  Urethral: Nontender, no masses  Urethral meatus: Normal  Bladder: Non-tender  Musculoskeletal:         General: Normal range of motion.      Cervical back:  Normal range of motion.   Skin:     General: Skin is warm and dry.   Neurological:      Mental Status: She is alert. Mental status is at baseline.   Psychiatric:         Mood and Affect: Mood normal.         Behavior: Behavior normal.         Thought Content: Thought content normal.              ASSESSMENT:       ICD-10-CM ICD-9-CM    1. Well woman exam  Z01.419 V72.31                  PLAN:     -- Pap up to date.  -- STI screen, contraception declined.  -- RTC in 1 year for annual or sooner, PRN.      Amber Keith MD